# Patient Record
Sex: FEMALE | Race: BLACK OR AFRICAN AMERICAN | NOT HISPANIC OR LATINO | URBAN - METROPOLITAN AREA
[De-identification: names, ages, dates, MRNs, and addresses within clinical notes are randomized per-mention and may not be internally consistent; named-entity substitution may affect disease eponyms.]

---

## 2019-03-08 ENCOUNTER — EMERGENCY (EMERGENCY)
Facility: HOSPITAL | Age: 19
LOS: 1 days | Discharge: HOME | End: 2019-03-08
Attending: EMERGENCY MEDICINE
Payer: MEDICAID

## 2019-03-08 VITALS
SYSTOLIC BLOOD PRESSURE: 90 MMHG | OXYGEN SATURATION: 100 % | DIASTOLIC BLOOD PRESSURE: 60 MMHG | TEMPERATURE: 99 F | HEART RATE: 100 BPM

## 2019-03-08 VITALS
OXYGEN SATURATION: 99 % | TEMPERATURE: 98 F | WEIGHT: 102.07 LBS | SYSTOLIC BLOOD PRESSURE: 103 MMHG | DIASTOLIC BLOOD PRESSURE: 60 MMHG | RESPIRATION RATE: 18 BRPM | HEART RATE: 74 BPM

## 2019-03-08 LAB
APPEARANCE UR: CLEAR — SIGNIFICANT CHANGE UP
BACTERIA # UR AUTO: ABNORMAL
BILIRUB UR-MCNC: NEGATIVE — SIGNIFICANT CHANGE UP
COLOR SPEC: YELLOW — SIGNIFICANT CHANGE UP
DIFF PNL FLD: NEGATIVE — SIGNIFICANT CHANGE UP
EPI CELLS # UR: SIGNIFICANT CHANGE UP
GLUCOSE UR QL: NEGATIVE MG/DL — SIGNIFICANT CHANGE UP
HCG UR QL: NEGATIVE — SIGNIFICANT CHANGE UP
HIV 1 & 2 AB SERPL IA.RAPID: SIGNIFICANT CHANGE UP
KETONES UR-MCNC: NEGATIVE — SIGNIFICANT CHANGE UP
LEUKOCYTE ESTERASE UR-ACNC: ABNORMAL
NITRITE UR-MCNC: NEGATIVE — SIGNIFICANT CHANGE UP
PH UR: 6 — SIGNIFICANT CHANGE UP (ref 5–8)
PROT UR-MCNC: 15 MG/DL
RBC CASTS # UR COMP ASSIST: ABNORMAL /HPF (ref 0–4)
SP GR SPEC: 1.02 — SIGNIFICANT CHANGE UP (ref 1.01–1.02)
UROBILINOGEN FLD QL: 1 MG/DL
WBC UR QL: ABNORMAL

## 2019-03-08 PROCEDURE — 86703 HIV-1/HIV-2 1 RESULT ANTBDY: CPT

## 2019-03-08 PROCEDURE — 81025 URINE PREGNANCY TEST: CPT

## 2019-03-08 PROCEDURE — 87591 N.GONORRHOEAE DNA AMP PROB: CPT

## 2019-03-08 PROCEDURE — 96372 THER/PROPH/DIAG INJ SC/IM: CPT

## 2019-03-08 PROCEDURE — 87086 URINE CULTURE/COLONY COUNT: CPT

## 2019-03-08 PROCEDURE — 87070 CULTURE OTHR SPECIMN AEROBIC: CPT

## 2019-03-08 PROCEDURE — 87491 CHLMYD TRACH DNA AMP PROBE: CPT

## 2019-03-08 PROCEDURE — 99284 EMERGENCY DEPT VISIT MOD MDM: CPT

## 2019-03-08 PROCEDURE — 99282 EMERGENCY DEPT VISIT SF MDM: CPT

## 2019-03-08 PROCEDURE — 99283 EMERGENCY DEPT VISIT LOW MDM: CPT

## 2019-03-08 PROCEDURE — 36415 COLL VENOUS BLD VENIPUNCTURE: CPT

## 2019-03-08 PROCEDURE — 99284 EMERGENCY DEPT VISIT MOD MDM: CPT | Mod: 25

## 2019-03-08 PROCEDURE — 81001 URINALYSIS AUTO W/SCOPE: CPT

## 2019-03-08 RX ORDER — IBUPROFEN 200 MG
600 TABLET ORAL ONCE
Qty: 0 | Refills: 0 | Status: DISCONTINUED | OUTPATIENT
Start: 2019-03-08 | End: 2019-03-13

## 2019-03-08 RX ORDER — CEFTRIAXONE 500 MG/1
250 INJECTION, POWDER, FOR SOLUTION INTRAMUSCULAR; INTRAVENOUS ONCE
Qty: 0 | Refills: 0 | Status: COMPLETED | OUTPATIENT
Start: 2019-03-08 | End: 2019-03-08

## 2019-03-08 RX ORDER — IBUPROFEN 200 MG
1 TABLET ORAL
Qty: 120 | Refills: 0
Start: 2019-03-08 | End: 2019-04-06

## 2019-03-08 RX ORDER — AZITHROMYCIN 500 MG/1
1000 TABLET, FILM COATED ORAL ONCE
Qty: 0 | Refills: 0 | Status: COMPLETED | OUTPATIENT
Start: 2019-03-08 | End: 2019-03-08

## 2019-03-08 RX ADMIN — AZITHROMYCIN 1000 MILLIGRAM(S): 500 TABLET, FILM COATED ORAL at 14:41

## 2019-03-08 RX ADMIN — CEFTRIAXONE 250 MILLIGRAM(S): 500 INJECTION, POWDER, FOR SOLUTION INTRAMUSCULAR; INTRAVENOUS at 14:41

## 2019-03-08 NOTE — ED PROVIDER NOTE - OBJECTIVE STATEMENT
Pt is a 18y F with no PMH who re-presented to ED after getting prescriptions from [facility] for bartholin cyst drainage antibiotics. She states she felt lightheaded vomited 1x and then had a few episodes of diarrhea. Pt states she did not eat all day and after taking the medication here and experiencing the pain she thinks the combination made her feel sick. She denies any abd pain. She is currently eating cookies and drinking juice stating "I am feeling better."

## 2019-03-08 NOTE — ED PROVIDER NOTE - CHPI ED SYMPTOMS NEG
no abdominal distension/no hematuria/no burning urination/no chills/no blood in stool/no dysuria/no fever

## 2019-03-08 NOTE — ED ADULT TRIAGE NOTE - SOURCE OF INFORMATION
Significant Other/Patient Quality 111:Pneumonia Vaccination Status For Older Adults: Pneumococcal Vaccination Administered Quality 110: Preventive Care And Screening: Influenza Immunization: Influenza Immunization Administered during Influenza season Detail Level: Detailed

## 2019-03-08 NOTE — ED PROVIDER NOTE - ATTENDING CONTRIBUTION TO CARE
well appearing young woman, NAD, sitting comfortably; no icterus; normal mucosa; abd soft nt nd; agree with acp plan of care
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
## 2019-03-08 NOTE — CONSULT NOTE ADULT - SUBJECTIVE AND OBJECTIVE BOX
18y    Last Menstrual Period 3 weeks ago  presents with 3d history of swelling and pain in left vaginal labia, right sided. Denies any dysuria, vaginal irritation/itching, abnormal discharge, bleeding, or history of STD's. Reports new partner with infrequent use of protection. Denies fevers, chills, malaise.     PAST MEDICAL HISTORY: anemia    PAST SURGICAL HISTORY: none      PAST OBSTETRICAL HISTORY: VD x1 (2017, PPROM at 27w)    PAST GYN HISTORY: none    No Known Allergies    Vital Signs Last 24 Hrs  T(C): 36.9 (08 Mar 2019 13:30), Max: 36.9 (08 Mar 2019 13:30)  T(F): 98.4 (08 Mar 2019 13:30), Max: 98.4 (08 Mar 2019 13:30)  HR: 74 (08 Mar 2019 13:30) (74 - 74)  BP: 103/60 (08 Mar 2019 13:30) (103/60 - 103/60)  RR: 18 (08 Mar 2019 13:30) (18 - 18)  SpO2: 99% (08 Mar 2019 13:30) (99% - 99%)    PHYSICAL EXAM:  PELVIC:        EXTERNAL GENITALIA: WNL         VAGINA: right labia majora swollen inferiorly, 7 oclock position, minimal erythema, tender to palpation    LABS:    Urinalysis Basic - ( 08 Mar 2019 14:52 )    Color: Yellow / Appearance: Clear / S.020 / pH: x  Gluc: x / Ketone: Negative  / Bili: Negative / Urobili: 1 mg/dL   Blood: x / Protein: 15 mg/dL / Nitrite: Negative   Leuk Esterase: Moderate / RBC: x / WBC x   Sq Epi: x / Non Sq Epi: x / Bacteria: x

## 2019-03-08 NOTE — CONSULT NOTE ADULT - ASSESSMENT
18y  Last Menstrual Period 3 weeks ago evaluated for painful labial swelling found to be a bartholin cyst.   - Bartholin cyst was lanced and drained at bedside   - wound did not require packing or suturing, hemostasis was obtained    Patient will be discharged home and will follow up with Dr. Dooley in 1 week.   - cultures sent   - STD panel ran by ED and Ceftriaxone x1 given for prophylactic measure   - will send home with Ibuprofen and Doxycycline     d/w and exam done w/ Dr. Dooley

## 2019-03-08 NOTE — ED PROVIDER NOTE - CHPI ED SYMPTOMS NEG
no hematuria/no burning urination/no vomiting/no chills/no dysuria/no abdominal distension/no blood in stool/no fever/no nausea/no diarrhea

## 2019-03-08 NOTE — ED ADULT NURSE NOTE - OBJECTIVE STATEMENT
pt reports swelling and pain to right side of vagina for 3 days. denies discharge, denies cuts or abrasions. denies pain with urination.

## 2019-03-08 NOTE — ED ADULT TRIAGE NOTE - CHIEF COMPLAINT QUOTE
pt just discharged from ED after bartholin cyst drained by GYN, returns with c/o vomiting and diarrhea.

## 2019-03-08 NOTE — ED PROVIDER NOTE - ATTENDING CONTRIBUTION TO CARE
I personally saw the patient with the PA, and completed the key components of the history and physical exam. I then discussed the management plan with the PA.    Agree with HPI as above;      GEN - NAD; well appearing; A+O x3   NECK: Neck supple  PULM - CTA b/l,  symmetric breath sounds  COR -  normal heart sounds    ABD - , ND, NT, soft, no guarding, no rebound, no masses    BACK - no CVA tenderness, nontender spine     -- R Bartholin's cyst.    IMP: Pt presents with R bartholin's cyst. No discharge seen on speculum exam performed by SILVIA Nix;  will consult GYN for I&D

## 2019-03-09 LAB
C TRACH RRNA SPEC QL NAA+PROBE: SIGNIFICANT CHANGE UP
CULTURE RESULTS: NO GROWTH — SIGNIFICANT CHANGE UP
N GONORRHOEA RRNA SPEC QL NAA+PROBE: SIGNIFICANT CHANGE UP
SPECIMEN SOURCE: SIGNIFICANT CHANGE UP
SPECIMEN SOURCE: SIGNIFICANT CHANGE UP

## 2019-03-10 LAB
CULTURE RESULTS: SIGNIFICANT CHANGE UP
SPECIMEN SOURCE: SIGNIFICANT CHANGE UP

## 2020-02-29 ENCOUNTER — EMERGENCY (EMERGENCY)
Facility: HOSPITAL | Age: 20
LOS: 1 days | Discharge: HOME | End: 2020-02-29
Attending: EMERGENCY MEDICINE
Payer: MEDICAID

## 2020-02-29 VITALS
DIASTOLIC BLOOD PRESSURE: 68 MMHG | SYSTOLIC BLOOD PRESSURE: 121 MMHG | RESPIRATION RATE: 20 BRPM | HEART RATE: 80 BPM | OXYGEN SATURATION: 100 % | TEMPERATURE: 98 F

## 2020-02-29 PROCEDURE — 99283 EMERGENCY DEPT VISIT LOW MDM: CPT

## 2020-02-29 PROCEDURE — 99284 EMERGENCY DEPT VISIT MOD MDM: CPT

## 2020-02-29 PROCEDURE — 56420 I&D BARTHOLINS GLAND ABSCESS: CPT

## 2020-02-29 PROCEDURE — 87070 CULTURE OTHR SPECIMN AEROBIC: CPT

## 2020-02-29 NOTE — CONSULT NOTE ADULT - ASSESSMENT
18yo  recently delivered in December with recurrence of bartholin cyst, lanced and drained at bedside. Vega catheter placed. Pt can be discharged home and will follow up with Dr. Olivas in 1 week. Cultures sent. Will send home with doxycycline    seen with Dr. Olivas

## 2020-02-29 NOTE — CONSULT NOTE ADULT - SUBJECTIVE AND OBJECTIVE BOX
HPI: 19y  recently delivered in December who presents with pelvic pain from a bartholin cyst. Pt has a history of these. This is her third this year. Was seen last year by GYN for the same issue. Denies any fevers, chills, nausea, vomiting, chest pain, shortness of breath, dizziness, headaches.    PAST MEDICAL HISTORY: anemia    PAST SURGICAL HISTORY: none    PAST OBSTETRICAL HISTORY: VD x1 (2017, PPROM at 27w), 1 CS (2019)    PAST GYN HISTORY: none    No Known Allergies    MEDS: none    Vital Signs Last 24 Hrs  T(C): 36.7 (2020 15:17), Max: 36.7 (2020 15:17)  T(F): 98.1 (2020 15:17), Max: 98.1 (2020 15:17)  HR: 80 (2020 15:17) (80 - 80)  BP: 121/68 (2020 15:17) (121/68 - 121/68)  BP(mean): --  RR: 20 (2020 15:17) (20 - 20)  SpO2: 100% (2020 15:17) (100% - 100%)     PHYSICAL EXAM:  CHEST/LUNG: Clear to percussion bilaterally; No rales, rhonchi, wheezing, or rubs  HEART: Regular rate and rhythm; No murmurs, rubs, or gallops  ABDOMEN: Soft, Nontender, Nondistended; Bowel sounds present  EXTREMITIES:  2+ Peripheral Pulses, No clubbing, cyanosis, or edema  PELVIC:        EXTERNAL GENITALIA: bartholin cyst noted on right side approximately 2cm in size. 11 blade used to puncture cyst after using lidocaine for anesthesia. Pus appearing fluid from wound. Vega catheter placed.

## 2020-02-29 NOTE — ED STATDOCS - OBJECTIVE STATEMENT
20 y/o F presents with c/o bartholin cyst on right side worsening over several days.  Recurred from 1 year ago, had treatment here and was told if it recurred she would need sx.  No fever, chills or discharge. 18 y/o F presents with c/o bartholin cyst on right side of vagina, worsening over several days.  Recurred from 1 year ago, had treatment here and was told if it recurred she would need sx.  No fever, chills or discharge.

## 2020-02-29 NOTE — ED STATDOCS - CARE PROVIDER_API CALL
Adam Olivas)  Obstetrics and Gynecology  [address], [address]  [place] [address]  Phone: [phone]  Fax: [phone]  Follow Up Time: 4-6 Days

## 2020-03-02 LAB
CULTURE RESULTS: SIGNIFICANT CHANGE UP
SPECIMEN SOURCE: SIGNIFICANT CHANGE UP

## 2020-03-03 LAB
CULTURE RESULTS: SIGNIFICANT CHANGE UP
SPECIMEN SOURCE: SIGNIFICANT CHANGE UP

## 2022-02-15 ENCOUNTER — EMERGENCY (EMERGENCY)
Facility: HOSPITAL | Age: 22
LOS: 1 days | Discharge: HOME | End: 2022-02-15
Attending: STUDENT IN AN ORGANIZED HEALTH CARE EDUCATION/TRAINING PROGRAM
Payer: MEDICAID

## 2022-02-15 VITALS
HEART RATE: 76 BPM | RESPIRATION RATE: 18 BRPM | OXYGEN SATURATION: 98 % | SYSTOLIC BLOOD PRESSURE: 123 MMHG | TEMPERATURE: 98 F | DIASTOLIC BLOOD PRESSURE: 77 MMHG

## 2022-02-15 PROCEDURE — 73610 X-RAY EXAM OF ANKLE: CPT | Mod: 26,RT

## 2022-02-15 PROCEDURE — 99284 EMERGENCY DEPT VISIT MOD MDM: CPT | Mod: 25

## 2022-02-15 PROCEDURE — 73630 X-RAY EXAM OF FOOT: CPT

## 2022-02-15 PROCEDURE — 73610 X-RAY EXAM OF ANKLE: CPT

## 2022-02-15 PROCEDURE — 99283 EMERGENCY DEPT VISIT LOW MDM: CPT

## 2022-02-15 PROCEDURE — 73630 X-RAY EXAM OF FOOT: CPT | Mod: 26,RT

## 2022-02-15 RX ORDER — ACETAMINOPHEN 500 MG
975 TABLET ORAL ONCE
Refills: 0 | Status: COMPLETED | OUTPATIENT
Start: 2022-02-15 | End: 2022-02-15

## 2022-02-15 RX ADMIN — Medication 975 MILLIGRAM(S): at 19:48

## 2022-02-15 NOTE — ED PROVIDER NOTE - CLINICAL SUMMARY MEDICAL DECISION MAKING FREE TEXT BOX
22 yo female no PMHx, approx. 6 weeks GA presents to ED c/o right foot/ankle injury x4 hours ago. 20 yo female no PMHx, approx. 6 weeks GA presents to ED c/o right foot/ankle injury x4 hours ago. XR negative. Ace/shoe. Patient to be discharged. Patient provided verbal/written discharge instructions and return precautions. Patient expresses understanding and agreement.

## 2022-02-15 NOTE — ED PROVIDER NOTE - CARE PROVIDER_API CALL
Scott Solo)  Orthopaedic Surgery  [address] [place] [address]  Phone: [phone]  Fax: [phone]  Follow Up Time:     Alona Saunders)  Podiatric Medicine and Surgery  [address] [place] [address]  Phone: [phone]  Fax: [phone]  Follow Up Time:

## 2022-02-15 NOTE — ED PROVIDER NOTE - PHYSICAL EXAMINATION
Gen: Nontoxic, well appearing, in NAD.  Skin: Warm and dry as visualized.  Head: NC/AT.  Neck: Supple, FROM. Trachea midline.   Resp: No distress.  Cardio: Well perfused.  Ext: No deformities. MAEx4. FROM.   MSK: No deformity. No fibula head/proximal fibula tenderness. +Tenderness to right lateral malleolus, no medial malleolus tenderness. +Tenderness/swelling to ATFL. FROM. Decreased strength 2/2 pain.  Neuro: A&Ox3. No motor/sensory deficits.

## 2022-02-15 NOTE — ED PROVIDER NOTE - CARE PROVIDERS DIRECT ADDRESSES
[email or web address]

## 2022-02-15 NOTE — ED ADULT NURSE NOTE - OBJECTIVE STATEMENT
tripped on toy and injured right foot.  Swelling and ecchymosis present to top right side of right foot.

## 2022-02-15 NOTE — ED PROVIDER NOTE - PATIENT PORTAL LINK FT
You can access the FollowMyHealth Patient Portal offered by [facility] by registering at the following website: [email or web address]. By joining [employer]’s FollowMyHealth portal, you will also be able to view your health information using other applications (apps) compatible with our system.

## 2022-02-15 NOTE — ED PROVIDER NOTE - OBJECTIVE STATEMENT
20 yo female no PMHx, approx. 6 weeks GA presents to ED c/o right foot/ankle injury x4 hours ago. Fell over daughters toy. Unable to bear weight. Did not self medicate PTA. No further complaints at this time.

## 2022-02-15 NOTE — ED PROVIDER NOTE - ATTENDING CONTRIBUTION TO CARE
I performed a face to face history and physical exam of the patient and discussed their management with the student/resident/ACP. I reviewed the student/resident/ACP's note and agree with the documented findings and plan of care.    Pt with R foot/ankle pain s/p inversion injury.    physical - ttp over 5th metatarsal. no swelling DP and PT 2+    plan - XR with pseudojones.  pt put in hard soled shoe.  instructed to f/up with ortho.

## 2022-02-15 NOTE — ED PROVIDER NOTE - NSFOLLOWUPINSTRUCTIONS_ED_ALL_ED_FT
- Acetaminophen 650mg every 6 hours as needed for pain.   - Rest.   - Ice.  - Elevate.   - Ace Wrap/AirCast.  - Ambulate as tolerated. Crutches 1-2 days if needed.   - Please call tomorrow to schedule follow up appointment with orthopedist.  - Please bring all documentation from your ED visit to any related future follow up appointment.  - Please call to schedule follow up appointment with your primary care physician within 24-48 hours.  - Please seek immediate medical attention for any new/worsening, signs/symptoms, or concerns.    Feel better! - Acetaminophen 650mg every 6 hours as needed for pain.   - Rest.   - Ice.  - Elevate.   - Ace Wrap/AirCast.  - Ambulate as tolerated. Crutches 1-2 days if needed.   - Please call tomorrow to schedule follow up appointment with orthopedist.  - Please bring all documentation from your ED visit to any related future follow up appointment.  - Please call to schedule follow up appointment with your primary care physician within 24-48 hours.  - Please seek immediate medical attention for any new/worsening, signs/symptoms, or concerns.    Feel better!      Foot Pain      Many things can cause foot pain. Some common causes are:  •An injury.      •A sprain.      •Arthritis.      •Blisters.      •Bunions.        Follow these instructions at home:      Managing pain, stiffness, and swelling      If directed, put ice on the painful area:  •Put ice in a plastic bag.      •Place a towel between your skin and the bag.      •Leave the ice on for 20 minutes, 2–3 times a day.      Activity     • Do not stand or walk for long periods.      •Return to your normal activities as told by your health care provider. Ask your health care provider what activities are safe for you.      •Do stretches to relieve foot pain and stiffness as told by your health care provider.      • Do not lift anything that is heavier than 10 lb (4.5 kg), or the limit that you are told, until your health care provider says that it is safe. Lifting a lot of weight can put added pressure on your feet.      Lifestyle     •Wear comfortable, supportive shoes that fit you well. Do not wear high heels.      •Keep your feet clean and dry.      General instructions     •Take over-the-counter and prescription medicines only as told by your health care provider.      •Rub your foot gently.      •Pay attention to any changes in your symptoms.      •Keep all follow-up visits as told by your health care provider. This is important.        Contact a health care provider if:    •Your pain does not get better after a few days of self-care.      •Your pain gets worse.      •You cannot stand on your foot.        Get help right away if:    •Your foot is numb or tingling.      •Your foot or toes are swollen.      •Your foot or toes turn white or blue.      •You have warmth and redness along your foot.        Summary    •Common causes of foot pain are injury, sprain, arthritis, blisters or bunions.      •Ice, medicines, and comfortable shoes may help foot pain.      •Contact your health care provider if your pain does not get better after a few days of self-care.      This information is not intended to replace advice given to you by your health care provider. Make sure you discuss any questions you have with your health care provider.

## 2022-02-15 NOTE — ED ADULT TRIAGE NOTE - CHIEF COMPLAINT QUOTE
Presents to ED s/p tripping over her daughters toy today around 15:00. States that she injured her right foot and ankle and is having trouble walking. Denies head trauma. 6 weeks pregnant.

## 2022-02-17 NOTE — ED POST DISCHARGE NOTE - DETAILS
Spoke with pt, states she is still having pain. Is using hard soled shoe as given. Has not yet f/u with ortho/podiatry. Information given, pt will f/u. Questions answered.

## 2022-09-06 ENCOUNTER — EMERGENCY (EMERGENCY)
Facility: HOSPITAL | Age: 22
LOS: 1 days | Discharge: HOME | End: 2022-09-06
Attending: EMERGENCY MEDICINE
Payer: MEDICAID

## 2022-09-06 VITALS
RESPIRATION RATE: 16 BRPM | HEIGHT: 60 IN | HEART RATE: 99 BPM | SYSTOLIC BLOOD PRESSURE: 121 MMHG | DIASTOLIC BLOOD PRESSURE: 65 MMHG | TEMPERATURE: 98 F | WEIGHT: 110.01 LBS | OXYGEN SATURATION: 96 %

## 2022-09-06 PROCEDURE — 29125 APPL SHORT ARM SPLINT STATIC: CPT | Mod: LT

## 2022-09-06 PROCEDURE — 70486 CT MAXILLOFACIAL W/O DYE: CPT | Mod: 26,MA

## 2022-09-06 PROCEDURE — 99285 EMERGENCY DEPT VISIT HI MDM: CPT | Mod: 25

## 2022-09-06 PROCEDURE — 73110 X-RAY EXAM OF WRIST: CPT | Mod: 26,LT

## 2022-09-06 PROCEDURE — 73600 X-RAY EXAM OF ANKLE: CPT

## 2022-09-06 PROCEDURE — 29125 APPL SHORT ARM SPLINT STATIC: CPT

## 2022-09-06 PROCEDURE — 73600 X-RAY EXAM OF ANKLE: CPT | Mod: 26,LT

## 2022-09-06 PROCEDURE — 70486 CT MAXILLOFACIAL W/O DYE: CPT | Mod: MA

## 2022-09-06 PROCEDURE — 73620 X-RAY EXAM OF FOOT: CPT | Mod: 26,LT

## 2022-09-06 PROCEDURE — 73620 X-RAY EXAM OF FOOT: CPT

## 2022-09-06 PROCEDURE — 73130 X-RAY EXAM OF HAND: CPT | Mod: 26,LT

## 2022-09-06 PROCEDURE — 73110 X-RAY EXAM OF WRIST: CPT

## 2022-09-06 PROCEDURE — 73130 X-RAY EXAM OF HAND: CPT

## 2022-09-06 PROCEDURE — 99284 EMERGENCY DEPT VISIT MOD MDM: CPT
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
## 2022-09-06 NOTE — ED PROVIDER NOTE - MUSCULOSKELETAL MINIMAL EXAM
+ left wrist tenderness with snuff box tenderness. + left ankle tenderness. able to bear weight. sensation and ROM intact.
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
## 2022-09-06 NOTE — ED PROVIDER NOTE - ENMT NEGATIVE STATEMENT, MLM
Ears: no ear pain and no hearing problems. Nose: no nasal congestion and no nasal drainage. Mouth/Throat: no dysphagia, no hoarseness and no throat pain. Neck: no lumps, no pain, no stiffness and no swollen glands. +right chin pain with 3 sutures.
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
## 2022-09-06 NOTE — ED PROCEDURE NOTE - CPROC ED POST PROC CARE GUIDE1
Verbal/written post procedure instructions were given to patient/caregiver./Instructed patient/caregiver to follow-up with primary care physician./Instructed patient/caregiver regarding signs and symptoms of infection./Elevate the injured extremity as instructed.
Verbal/written post procedure instructions were given to patient/caregiver./Instructed patient/caregiver to follow-up with primary care physician./Instructed patient/caregiver regarding signs and symptoms of infection./Keep the cast/splint/dressing clean and dry.

## 2022-09-06 NOTE — ED PROVIDER NOTE - PATIENT PORTAL LINK FT
You can access the FollowMyHealth Patient Portal offered by [facility] by registering at the following website: [email or web address]. By joining [employer]’s FollowMyHealth portal, you will also be able to view your health information using other applications (apps) compatible with our system.

## 2022-09-06 NOTE — ED ADULT TRIAGE NOTE - HEART RATE (BEATS/MIN)
ED Time Seen By Provider Entered On:  10/29/2019 5:03     Performed On:  10/29/2019 5:03  by Paola Gomez MD               Time Seen By Provider   Time Seen by Provider :   10/29/2019 5:03    Nathan Gao MD, Paola Bernal - 10/29/2019 5:03                Electronically Signed On 10.29.2019 05:03  ___________________________________________________   Paola Gomez MD     99
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
## 2025-06-24 NOTE — ED PROVIDER NOTE - CROS ED CONS ALL NEG
Survey:     You may be receiving a survey from [facility] regarding your visit today.     You may get this in the mail, through your MyChart or in your email.      Please complete the survey to enable us to provide the highest quality of care to you and your family. Please also, mention our names.     If you cannot score us as very good (5 Stars) on any question, please feel free to call the office to discuss how we could have made your experience exceptional.      Thank You!        Dr. Young, MD Duvall, KRISTI Deutsch, ASHISH Ruff, APRN CATRACHITO Frankel, ASHISH Benton, ASHISH       Plan:  1. Hospital discharge follow-up  Doing well post discharge.    - [place] DISCHARGE MEDS RECONCILED W/ CURRENT OUTPATIENT MED LIST    2. Generalized weakness  Continue to walk with the walker until the strength and balance is better.     3. Lower extremity edema  Recommend taking an extra lasix and potassium for the next two days.  Recommend wearing compression stockings daily (20 to 30 mmHg).      4. COPD exacerbation (HCC)  Doing well after hospitalization.  No additional treatment needed at this time.     negative...